# Patient Record
Sex: MALE | Race: WHITE | NOT HISPANIC OR LATINO | URBAN - METROPOLITAN AREA
[De-identification: names, ages, dates, MRNs, and addresses within clinical notes are randomized per-mention and may not be internally consistent; named-entity substitution may affect disease eponyms.]

---

## 2020-02-16 ENCOUNTER — EMERGENCY (EMERGENCY)
Facility: HOSPITAL | Age: 55
LOS: 1 days | Discharge: HOME | End: 2020-02-16
Attending: EMERGENCY MEDICINE
Payer: COMMERCIAL

## 2020-02-16 VITALS
DIASTOLIC BLOOD PRESSURE: 99 MMHG | HEART RATE: 77 BPM | SYSTOLIC BLOOD PRESSURE: 168 MMHG | TEMPERATURE: 98 F | RESPIRATION RATE: 16 BRPM | OXYGEN SATURATION: 96 %

## 2020-02-16 VITALS
HEIGHT: 61 IN | OXYGEN SATURATION: 96 % | WEIGHT: 164.02 LBS | HEART RATE: 83 BPM | TEMPERATURE: 98 F | DIASTOLIC BLOOD PRESSURE: 95 MMHG | RESPIRATION RATE: 16 BRPM | SYSTOLIC BLOOD PRESSURE: 176 MMHG

## 2020-02-16 PROCEDURE — 73110 X-RAY EXAM OF WRIST: CPT | Mod: 26,LT

## 2020-02-16 PROCEDURE — 26770 TREAT FINGER DISLOCATION: CPT | Mod: F1

## 2020-02-16 PROCEDURE — 26770 TREAT FINGER DISLOCATION: CPT | Mod: 54

## 2020-02-16 PROCEDURE — 73130 X-RAY EXAM OF HAND: CPT

## 2020-02-16 PROCEDURE — 73110 X-RAY EXAM OF WRIST: CPT

## 2020-02-16 PROCEDURE — 99284 EMERGENCY DEPT VISIT MOD MDM: CPT | Mod: 57

## 2020-02-16 PROCEDURE — 73130 X-RAY EXAM OF HAND: CPT | Mod: 26,LT

## 2020-02-16 PROCEDURE — 73120 X-RAY EXAM OF HAND: CPT

## 2020-02-16 PROCEDURE — 99284 EMERGENCY DEPT VISIT MOD MDM: CPT | Mod: 25

## 2020-02-16 RX ORDER — ACETAMINOPHEN 500 MG
650 TABLET ORAL ONCE
Refills: 0 | Status: COMPLETED | OUTPATIENT
Start: 2020-02-16 | End: 2020-02-16

## 2020-02-16 RX ADMIN — Medication 650 MILLIGRAM(S): at 20:24

## 2020-02-16 NOTE — ED ADULT NURSE NOTE - NSIMPLEMENTINTERV_GEN_ALL_ED
Implemented All Universal Safety Interventions:  [place] to call system. Call bell, personal items and telephone within reach. Instruct patient to call for assistance. Room bathroom lighting operational. Non-slip footwear when patient is off stretcher. Physically safe environment: no spills, clutter or unnecessary equipment. Stretcher in lowest position, wheels locked, appropriate side rails in place.

## 2020-02-16 NOTE — ED PROVIDER NOTE - NSFOLLOWUPINSTRUCTIONS_ED_ALL_ED_FT
Dislocation    A dislocation is a displacement of the bones that form a joint. This can result from trauma or due to a previous weakening of that joint. Symptoms include pain, swelling, and deformity at the site. Your health care provider has performed maneuvers to place the bones back in place. If a splint or brace was applied, make sure to wear it until you see an orthopedist as instructed.     SEEK IMMEDIATE MEDICAL CARE IF YOU HAVE ANY OF THE FOLLOWING SYMPTOMS: numbness, tingling, pain, weakness, or skin color/temperature change in any part of your body distal to the injury.     You had a dislocation to your left pinky.    Your dislocated pinky was re-aligned.    1) Follow up with your doctor. You were provided with information for the orthopedist  2) Return to the ED immediately for new or worsening symptoms  3) Please continue to take any home medications as prescribed  4) Your test results from your ED visit were discussed with you prior to discharge  5) You were provided with a copy of your test results  6) Please take tylenol 650 mg every 4 hours as needed for pain. Please do not exceed more than 4,000mg of Tylenol in a day   7) Please take Motrin 600mg by mouth every 6 hours as needed for pain. Please take this medication with food.   8) You can keep your fingers taped together for support

## 2020-02-16 NOTE — ED PROVIDER NOTE - NSFOLLOWUPCLINICS_GEN_ALL_ED_FT
[facility] Orthopedic Surgery  Orthopedic Surgery  [address], [address]rd & 4th [place] [address]  Phone: [phone]  Fax:   Follow Up Time:

## 2020-02-16 NOTE — ED PROVIDER NOTE - OBJECTIVE STATEMENT
53 y/o M w/ no sig PMH presenting w/ L pinky injury. Blue 3L. Pt reports shortly prior to ED arrival he suffered a trip and fall at home. Was attempting to walk by his girlfriends wheelchair when his foot got caught on a table and fell forward onto his hands. Denies head strike or LOC. No blood thinners. After the fall, noticed that his L pinky was deformed. Only experiencing minimal pain at this time. Has been icing it. Did not take pain meds at home. Neighbor in building is doctor who told pt to come to ED for evaluation. Denies fevers, chills, headache, dizziness, blurred vision, chest pain, cough, shortness of breath, abdominal pain, n/v/d/c, urinary symptoms, rash.

## 2020-02-16 NOTE — ED PROVIDER NOTE - NS ED ROS FT
GENERAL: No fever or chills  EYES: No change in vision  HEENT: No trouble swallowing or speaking  CARDIAC: No chest pain  PULMONARY: No cough or SOB  GI: No abdominal pain, no nausea or no vomiting, no diarrhea or constipation  : No changes in urination  SKIN: No rashes  NEURO: No headache, no numbness  MSK: L pinky injury  Otherwise as HPI or negative.

## 2020-02-16 NOTE — ED PROVIDER NOTE - PATIENT PORTAL LINK FT
You can access the FollowMyHealth Patient Portal offered by  by registering at the following website: [email or web address]. By joining [facility]’s FollowMyHealth portal, you will also be able to view your health information using other applications (apps) compatible with our system.

## 2020-02-16 NOTE — ED ADULT NURSE NOTE - OBJECTIVE STATEMENT
53 y/o male presented to the ED s/p fall at home. pt fell on L side. denies LOC or blood thinners. pt fell on L hand. L fifth digit has swelling and redness. pt denies any pain. pt fifth digit is outward. pt is A&Ox4. awaiting md dispo

## 2020-02-16 NOTE — ED PROVIDER NOTE - PROGRESS NOTE DETAILS
Dr. Eric Schrader, PGY-2: reduction performed at bedside. Good cap refill. 5th digit now able to flex and extend normally. Post reduction films ordered Dr. Eric Schrader, PGY-2: post reduction film w/ "tiny osseous marshal anterior to 5th PIP." This finding would not affect management at this time. 5th digit remi taped to 4th. Will provide ortho f/u information. Return precautions provided, pt verbalized understanding. Ready for DC.

## 2020-02-16 NOTE — ED PROVIDER NOTE - CLINICAL SUMMARY MEDICAL DECISION MAKING FREE TEXT BOX
53 y/o M w/ no sig PMH presenting w/ L 5th digit injury. Fracture vs. dislocation based on exam. Unlikely only sprain based on appearance. Plan for imaging, tylenol, and treat injury accordingly. Will reassess the need for additional interventions as clinically warranted.

## 2020-02-16 NOTE — ED PROVIDER NOTE - ATTENDING CONTRIBUTION TO CARE
53yo male no pmh no a/c p/w left 5th digit pain after mechanical fall. Denies head trauma, vision changes, n/v/d, abd pain, chest pain, dyspnea. Left 5th digit with ulnar deviation at MCP. Normal cap refill. No snuff box tenderness. Xrays, pain control 53yo male no pmh no a/c p/w left 5th digit pain after mechanical fall. Denies head trauma, vision changes, n/v/d, abd pain, chest pain, dyspnea. Left 5th digit with ulnar deviation at PIP. Normal cap refill. No snuff box tenderness. Xrays, pain control